# Patient Record
Sex: MALE | Race: WHITE
[De-identification: names, ages, dates, MRNs, and addresses within clinical notes are randomized per-mention and may not be internally consistent; named-entity substitution may affect disease eponyms.]

---

## 2019-03-05 ENCOUNTER — HOSPITAL ENCOUNTER (OUTPATIENT)
Dept: HOSPITAL 25 - ED | Age: 24
Setting detail: OBSERVATION
LOS: 2 days | Discharge: HOME | End: 2019-03-07
Attending: ORTHOPAEDIC SURGERY | Admitting: ORTHOPAEDIC SURGERY
Payer: COMMERCIAL

## 2019-03-05 DIAGNOSIS — S93.314A: Primary | ICD-10-CM
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
## 2019-03-05 DIAGNOSIS — R60.9: ICD-10-CM

## 2019-03-05 DIAGNOSIS — M79.671: ICD-10-CM

## 2019-03-05 DIAGNOSIS — Z88.0: ICD-10-CM

## 2019-03-05 DIAGNOSIS — S92.101A: ICD-10-CM
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
## 2019-03-05 DIAGNOSIS — M25.572: ICD-10-CM

## 2019-03-05 PROCEDURE — 96366 THER/PROPH/DIAG IV INF ADDON: CPT

## 2019-03-05 PROCEDURE — 76000 FLUOROSCOPY <1 HR PHYS/QHP: CPT

## 2019-03-05 PROCEDURE — 90686 IIV4 VACC NO PRSV 0.5 ML IM: CPT

## 2019-03-05 PROCEDURE — 86703 HIV-1/HIV-2 1 RESULT ANTBDY: CPT

## 2019-03-05 PROCEDURE — 96365 THER/PROPH/DIAG IV INF INIT: CPT

## 2019-03-05 PROCEDURE — 96375 TX/PRO/DX INJ NEW DRUG ADDON: CPT

## 2019-03-05 PROCEDURE — 36415 COLL VENOUS BLD VENIPUNCTURE: CPT

## 2019-03-05 PROCEDURE — 96372 THER/PROPH/DIAG INJ SC/IM: CPT

## 2019-03-05 PROCEDURE — 96376 TX/PRO/DX INJ SAME DRUG ADON: CPT

## 2019-03-05 PROCEDURE — 90471 IMMUNIZATION ADMIN: CPT

## 2019-03-05 PROCEDURE — G0378 HOSPITAL OBSERVATION PER HR: HCPCS

## 2019-03-05 PROCEDURE — G0008 ADMIN INFLUENZA VIRUS VAC: HCPCS

## 2019-03-05 PROCEDURE — 99285 EMERGENCY DEPT VISIT HI MDM: CPT

## 2019-03-05 NOTE — ED
Lower Extremity





- HPI Summary


HPI Summary: 





Pt is a 22 y/o M presenting to the ED brought in by EMS for a lower extremity 

injury described as sharp/throbbing. The pt reports he was playing basketball, 

landed on his ankle incorrectly, and twisted it. Movement makes the pain worse.





- History of Current Complaint


Chief Complaint: EDExtremityLower


Stated Complaint: RT DISLOCATED ANKLE PER EMS


Time Seen by Provider: 03/05/19 22:00


Hx Obtained From: Patient


Mechanism Of Injury: Fall From A Standing Position


Onset of Pain: Immediate


Onset/Duration: Still Present


Severity Initially: Severe


Severity Currently: Severe


Pain Intensity: 8


Pain Scale Used: 0-10 Numeric


Timing: Constant, Lasting Hours


Location: Is Discrete @ - R ankle


Character Of Pain: Sharp, Throbbing


Associated Signs And Symptoms: Positive: Swelling, Redness


Aggravating Factor(s): Movement


Alleviating Factor(s): Rest


Able to Bear Weight: No


Related History: Other - basketball game





- Allergies/Home Medications


Allergies/Adverse Reactions: 


 Allergies











Allergy/AdvReac Type Severity Reaction Status Date / Time


 


MS Penicillins [Penicillins] Allergy  Unknown Verified 03/05/19 21:53





   Reaction  





   Details  














PMH/Surg Hx/FS Hx/Imm Hx


Previously Healthy: Yes


Endocrine/Hematology History: 


   Denies: Hx Diabetes


Cardiovascular History: 


   Denies: Hx Congestive Heart Failure, Hx Hypertension


 History: 


   Denies: Hx Renal Disease


Infectious Disease History: No


Infectious Disease History: 


   Denies: Traveled Outside the [place] in Last 30 Days





- Family History


Known Family History: 


   Negative: Renal Disease





- Social History


Occupation: Student


Lives: Dormitory/Roommates


Alcohol Use: None


Hx Substance Use: No


Substance Use Type: Reports: None


Hx Tobacco Use: No


Smoking Status (MU): Never Smoked Tobacco





Review of Systems


Negative: Fever


Positive: Arthralgia, Edema


All Other Systems Reviewed And Are Negative: Yes





Physical Exam





- Summary


Physical Exam Summary: 





VITAL SIGNS: Reviewed.


GENERAL:  Patient is a well-developed and nourished male who is lying 

comfortable in the stretcher. Patient is not in any acute respiratory distress.


HEAD AND FACE: No signs of trauma. No ecchymosis, hematomas or skull 

depressions. No sinus tenderness.


EYES: PERRLA, EOMI x 2, No injected conjunctiva, no nystagmus.


EARS: Hearing grossly intact. Ear canals and tympanic membranes are within 

normal limits.


MOUTH: Oropharynx within normal limits.


NECK: Supple, trachea is midline, no adenopathy, no JVD, no carotid bruit, no c-

spine tenderness, neck with full ROM.


CHEST: Symmetric, no tenderness at palpation


LUNGS: Clear to auscultation bilaterally. No wheezing or crackles.


CVS: Regular rate and rhythm, S1 and S2 present, no murmurs or gallops 

appreciated.


ABDOMEN: Soft, non-tender. No signs of distention. No rebound no guarding, and 

no masses palpated. Bowel sounds are normal.


EXTREMITIES: Obvious deformity of R foot which is medially displaced, there is 

good sensation, good color, and good symmetrical pedal pulses.


NEURO: Alert and oriented x 3. No acute neurological deficits. Speech is normal 

and follows commands.


SKIN: Dry and warm





Triage Information Reviewed: Yes


Vital Signs On Initial Exam: 


 Initial Vitals











Temp Pulse Resp BP Pulse Ox


 


 99.0 F   72   20   165/83   96 


 


 03/05/19 21:45  03/05/19 21:45  03/05/19 21:45  03/05/19 21:45  03/05/19 21:45











Vital Signs Reviewed: Yes





Procedures





- Procedure Summary


Procedure Summary: 





Moderate sedation -  I used standard protocol and the pt consented to moderate 

sedation. I used fentanyl and versed, the pt's vital signs were stable before 

and after procedure. No reversal agent used.





Reduction of R foot - Reduction of subtalar dislocation of R foot. Under 

moderate sedation, the pt's foot was unable to be reduced. Surgery was called 

and the pt will be accepted to immediate surgery.





Neurovascular exam intact pre-reduction and post-reduction.








- Joint Reduction


  ** Right Subtalar


Joint Reduction Site: other - R foot


Specify Other Joint Reduced: R foot


Conscious Sedation: Yes - versed and fentanyl


Reduction Attempts: 1


Pre-Procedure NV Exam: Yes - normal


Post Joint Reduction Film: joint not reduced





Diagnostics





- Vital Signs


 Vital Signs











  Temp Pulse Resp BP Pulse Ox


 


 03/05/19 21:55   73   151/79  97


 


 03/05/19 21:45  99.0 F  72  20  165/83  96














- Laboratory


Lab Statement: Any lab studies that have been ordered have been reviewed, and 

results considered in the medical decision making process.





- Radiology


  ** Ankle X-ray


Radiology Interpretation Completed By: ED Physician


Summary of Radiographic Findings: Subtalar dislocation.  Pending official 

radiology report.





  ** Foot x-ray


Radiology Interpretation Completed By: ED Physician


Summary of Radiographic Findings: Subtalar dislocation.  Pending official 

radiology report.





Lower Extremity Course/Dx





- Course


Course Of Treatment: Pt is a 22 y/o M presenting to the ED brought in by EMS 

for a lower extremity injury described as sharp/throbbing. The pt reports he 

was playing basketball, landed on his ankle incorrectly, and twisted it. 

Movement makes the pain worse. Upon examination, obvious deformity of the R 

foot is noted. Foot/ankle x-rays confirmed this. I used standard protocol and 

the pt consented to moderate sedation. I used fentanyl and versed, the pt's 

vital signs were stable before and after procedure. No reversal agent used. 

Reduction of subtalar dislocation of R foot. Under moderate sedation, the pt's 

foot was unable to be reduced. Dr. Bassett was called and the pt will be 

accepted to surgery.





- Diagnoses


Provider Diagnoses: 


 Dislocation of right subtalar joint








Discharge





- Sign-Out/Discharge


Documenting (check all that apply): Patient Departure





- Discharge Plan


Condition: Stable


Disposition: ADMITTED TO [facility]





- Attestation Statements


Document Initiated by Scribe: Yes


Documenting Scribe: Hilda Cunha


Provider For Whom Tamikoibe is Documenting (Include Credential): Monse Pradhan MD.


Scribe Attestation: 


I, Hilda Cunha, scribed for Monse Pradhan MD. on 03/06/19 at 0201. 


Status of Scribe Document: Ready





Consult


Consult: 





0008 - Spoke with Dr. Sotelo of surgery who will be accepting the pt to [facility] [place] for 

surgery.

## 2019-03-06 RX ADMIN — IBUPROFEN PRN MG: 800 TABLET ORAL at 22:28

## 2019-03-06 RX ADMIN — IBUPROFEN PRN MG: 800 TABLET ORAL at 14:15

## 2019-03-06 RX ADMIN — CEFAZOLIN SODIUM SCH MLS/HR: 1 SOLUTION INTRAVENOUS at 17:04

## 2019-03-06 RX ADMIN — CEFAZOLIN SODIUM SCH MLS/HR: 1 SOLUTION INTRAVENOUS at 08:08

## 2019-03-06 RX ADMIN — IBUPROFEN PRN MG: 800 TABLET ORAL at 05:46

## 2019-03-06 NOTE — PN
Progress Note





- Progress Note


Date of Service: 03/06/19


Note: 





Pt seen and examined. Mild paresthesias globally but no numbness or tingling. 

Pain controlled.





 











Temp Pulse Resp BP Pulse Ox


 


 98.7 F   100   16   134/73   95 


 


 03/06/19 10:25  03/06/19 10:25  03/06/19 10:25  03/06/19 10:25  03/06/19 10:25








NAD. RLE splint intact. SILT grossly distally. brisk cap refill. able to flex/

ext toes. LLE: swelling and bruising laterally. able to DF/PF ankle. SILT 

grossly. brisk cap refill





A/P


POD#0 from open treatment of talonavicular and subtalar dislocation with 

concomitant talus fracture


NWB


will follow with Dr Phillips post operatively. 


discharge when meets PT goals and pain management. 


DVT ppx LMWH while in house.

## 2019-03-06 NOTE — OP
Operative Report - Blank





- Operative Report


Date of Operation: 03/06/19


Note: 


PATIENT: Yayo Bazzi





YOB: 1995





DATE OF SURGERY: 3/7/2019





SURGEON: Scotty Phillips MD





CO-SURGEON: Racheal Bassett MD





ANESTHESIOLOGIST: Dr. Brock





PREOPERATIVE DIAGNOSIS: Right subtalar joint dislocation. Right talonavicular 

joint dislocation.





POSTOPERATIVE DIAGNOSIS: Right subtalar joint dislocation. Right talonavicular 

joint dislocation. Right talar head fracture.





OPERATION: 


1. Open reduction of right subtalar joint dislocation


2. Open reduction of right talonavicular joint dislocation


3. Open treatment of right talar head fracture





ANESTHESIA: General





IMPLANTS: none





TOURNIQUET TIME: One hour with a well-padded thigh tourniquet at 250mmHg





SPECIMENS: none





ESTIMATED BLOOD LOSS: 20cc





COMPLICATIONS: none





STATUS: Stable from the operating room to the recovery room and then admitted.





INDICATIONS FOR PROCEDURE:


Yayo sustained a right ankle and foot injury playing basketball.  He was 

diagnosed with subtalar and talonavicular dislocations in the ER.  He was 

brought to the OR with my colleague, Dr. Bassett, and closed reduction was 

attempted and unsuccessful.  At this time, she called me to ask for my 

assistance, as this would need to proceed with open surgery. Upon my arrival to 

the operating room I attempted a closed reduction, but was unsuccessful, so we 

elected to move forward with open surgery. 





DESCRIPTION OF PROCEDURE:


The patient was carefully positioned on the operating room table. All bony 

prominences were padded with great care. A chlorhexidine based pre-scrub was 

performed followed by a chloraprep prep and drape in standard sterile fashion. 

A surgical safety pause was then conducted in which we confirmed the 

appropriate patient, extremity, planned procedure, availability of equipment, 

indication and administration of prophylactic antibiotics, and DVT prophylaxis 

in the form of a compression boot on the non-surgical extremity. 





We performed an Esmarch exsanguination of the limb and inflated the tourniquet.

  I utilized a sinus tarsi incision at the lateral hindfoot.  I carefully 

dissected down to the level of the subtalar joint, making sure to protect the 

peroneal tendons.  The extensor digitorum brevis and lateral ankle and hindfoot 

ligaments and capsular tissue had invaginated into the subtalar joint, so we 

removed these from the joint, which provided excellent visualization of the 

subtalar joint.  





Distally, the talar head was visualized.  Unfortunately, there was an 

osteochondral fracture of the inferior talar head.  The cartilage on this 

fragment was severely damaged and there were no soft tissue attachments to the 

displaced fracture fragment.





At this point, we tried to reduce the talonavicular and subtalar joints but 

were unsuccessful.  The block was at the talonavicular joint and the displaced 

talar head fracture was also blocking reduction.  Given that this was a free 

fragment, and the overlying cartilage was severely damaged, this fragment was 

excised.  I removed all obvious blocks to reduction laterally at the 

talonavicular joint, including capsular tissue.  It appeared that the lateral 

navicular was hinged on the talar neck medially.  Therefore, we decided to make 

a medial incision.





A longitudinal incision was then made between the tibialis anterior and 

posterior tibial tendons at the medial hindfoot.  We carefully dissected down 

to the layer of the bone.  There was in fact capsular tissue at the lateral 

navicular that was preventing mobilization of the navicular and reduction of 

the talonavicular joint.  This tissue was incised.  I was then able to use a 

Bess to carefully shoehorn the navicular back onto the talar head, thus 

performing an open reduction of both the talonavicular and subtalar joints.





Fluoroscopy was then used to confirm reduction of the hindfoot joints, notably 

the talonavicular, calcaneocuboid, and subtalar joints.





The wounds were then copiously irrigated and meticulously closed in layers 

utilizing 3-0 Monocryl for the lateral ligament, capsule, and EDB tissues.  3-0 

Monocryl was then used for the deep dermal layer. Skin staples and 3-0 nylon 

were used for the skin.  A sterile dressing was then applied followed by a 

splint with the ankle in a neutral position. 





The patient was then awakened from anesthesia and transferred to the recovery 

room in stable condition.  There were no complications.  All needle and sponge 

counts were correct at the end of the case.





ATTESTATION: I attest I was present and scrubbed for the entire open part of 

the procedure.





POSTOPERATIVE PLAN: The plan is for non-weight-bearing in a splint, elevation.

## 2019-03-06 NOTE — PN
Progress Note





- Progress Note


Date of Service: 03/06/19


Note: 


I saw Yayo this morning. Pain well controlled. Elevating b/l LE's. Toes WWP 

and wiggling. TTP at left lateral ankle.





We discussed injuries and surgery. Discussed the high likelihood of persistent 

problems with these severe injuries. 





Plan:


CT Right ankle and foot


Xray left ankle


NWB RLE, with strict elevation


Tall boot as needed for ambulation LLE


Aspirin 325mg po daily for DVT prophylaxis





Scotty Phillips MD

## 2019-03-06 NOTE — CONS
CONSULTATION REPORT:

 

DATE OF CONSULT:  03/05/19

 

CHIEF COMPLAINT:  Right foot pain, left ankle pain.

 

HISTORY OF PRESENT ILLNESS:  Briefly, Yayo is a 23-year-old male who was 
playing basketball today around 8:50 p.m. when he sustained an injury.  He 
landed awkwardly on his left and right ankle.  He sustained immediate deformity
, was unable to weight bear comfortably.  He was brought to the ER immediately 
and diagnosed with a medial subtalar dislocation.  He underwent attempted 
closed reduction and it was unable to be reduced, so I was called at proximally 
10:40.  I came in to see the patient at that time.  He denies any numbness or 
tingling.  He is reporting some ankle pain and swelling that is bothering him.  
He states the pain is about 5/10. No previous history of ankle issues at the 
time.

 

PAST MEDICAL HISTORY:  Negative.

 

PAST SURGICAL HISTORY:  Appendectomy.  He had no problems with anesthesia.

 

ALLERGIES:  Possible allergy to PENICILLIN, although family noted he had hives.
  He is not sure if he has a reaction.

 

FAMILY HISTORY:  Negative.  No history of DVT.

 

SOCIAL HISTORY:  He is a graduate from [employer] and works in social media 
campaign.  He is right-hand dominant.  He is a community ambulator.  He denies 
tobacco and alcohol.  He excises by playing basketball.  Review of systems is 
significant for the above complaint as well as left ankle pain and swelling.  
No shortness of breath or chest pain.  No numbness or tingling.  Otherwise, 
remainder of the systems is negative.

 

REVIEW OF SYSTEMS:  Otherwise negative with the exception of the above 
complaints of bilateral ankle pain. 



Vital Signs: T 99; pulse 72; RR 20; /83; pulse ox 96 on mask breather

 

PHYSICAL EXAM:  He is in no acute distress.  He is well developed, well 
nourished. He is alert and oriented x3.  Pleasant mood, normal affect.  Good 
balance and coordination of upper extremities.  EOMI. Chest CTA. CV: RRR. 
Abdomen: soft and nontender. He is able to move his bilateral upper 
extremities.  His left ankle has some swelling and tenderness laterally. 
Examination of the right foot demonstrate obvious deformity.  The skin is 
intact. There is no erythema or warmth.  There is a medial dislocation of the 
foot.  He is able to flex and extend his great toe and lesser toes.  He is 
sensate to light touch about the first dorsal webspace, and medial, lateral, 
dorsal, and plantar foot.  He has brisk cap refill and palpable DP pulse.

 

DIAGNOSTIC STUDIES/LAB DATA:  X-rays were reviewed of the foot and ankle that 
demonstrate what looks like an isolated subtalar dislocation.  On long view, it 
looks like there possibly is a fracture, although it is difficult to assess now.

 

ASSESSMENT AND PLAN:  We will take him to the OR emergently to undergo a closed 
versus open reduction of right foot under anesthesia.  We will place him in a 
splint.  We talked about the risks and benefits of surgery.  Risks include, but 
limited to, bleeding, infection, damage to nerves, vessels, surrounding 
structures, wound nonhealing, persistent pain, need for further surgery, 
scarring, stiffness, incomplete relief of symptoms, and risk of anesthesia.  We 
will attempt closed reduction, and if it needs to be opened, we will open.  I 
will have him follow up with Dr. Phillips post operatively. We will xray his 
left ankle after surgery if he requires admission. I will place the patient in 
a well-padded posterior split, ice, and elevate.  I will have him see my foot 
and ankle colleague for followup afterwards.

 

 [id number] #: [id number]

BRENDA

## 2019-03-06 NOTE — OP
OPERATIVE REPORT:

 

DATE OF OPERATION:  03/05/19

 

DATE OF BIRTH:  [birth year]

 

ATTENDING SURGEON:  Racheal Bassett MD.

 

CO-SURGEON:  Scotty Phillips MD.

 

ANESTHESIOLOGIST:  Dr. Brock

 

ANESTHESIA:  General.

 

PRE-OP DIAGNOSES:  Right closed subtalar and talonavicular dislocation.

 

POST-OP DIAGNOSES:  Right closed subtalar and talonavicular dislocation with 
talus fracture.

 

OPERATIVE PROCEDURE:  Open treatment of subtalar dislocation, open treatment of 
talonavicular dislocation, open treatment of talar head fracture.

 

COMPLICATIONS:  None.

 

ESTIMATED BLOOD LOSS:  20 cc.

 

TOURNIQUET TIME:  Approximately 55 minutes.

 

INDICATIONS:  Yayo Bazzi is a 23-year-old male who was playing basketball 
on the evening of 03/05/19, when he sustained injury to his left ankle and 
right foot and had an obvious deformity.  He was seen in the ER, diagnosed with 
subtalar dislocation.  It was not able to be put back with light sedation, 
therefore I was consulted and then brought him to the OR for closed versus open 
reduction under general anesthesia.

 

DESCRIPTION OF PROCEDURE:  The patient was greeted in the preoperative area by 
the attending surgeon.  Correct extremity was marked.  Consent was confirmed.  
The patient was brought back to the operative suite where he slept in the 
stretcher initially.  He underwent a general anesthesia and endotracheal 
intubation.  After appropriate surgical pause indicating site, side, procedure, 
we had no safety concerns.  A closed reduction was attempted, but the patient 
completely paralyzed and was unable to be fully reduced.  This was tried for 
approximately 20 minutes and then I called my partner as it was not able to be 
reduced.  Dr. Phillips, who then drove in to help me address this, attempted a 
closed reduction as well. As we were both unable to do this, we decided to 
treat this open.  For details of the the open portion of the case please see Dr Phillips's operating note. We both co- scrubbed in the case and both provided 
clinical input and were both present for key portions of this case as it was a 
challenging case.  During the operation, it was discovered that the patient had 
a talus head fracture and a chondral flap that was blocking the reduction and 
required medial and lateral incisions.  The patient did wake up at the end of 
the case.  Patient was placed in well-padded splint with brisk cap refill of 
his toes.

 

POSTOPERATIVE PLAN:  He will be nonweightbearing.  He does have an ankle sprain 
on the left side.  We will x-ray the left side.  We will also get a CT scan of 
the right ankle.  Dr. Phillips will maintain definitive management of his right 
ankle. We will admit the patient as it is very late at night and potentially 
discharge him in the morning with pain control when he is able to mobilize 
comfortably.

 

 [id number]/CPS #: [id number]

BRENDA

## 2019-03-07 VITALS — DIASTOLIC BLOOD PRESSURE: 67 MMHG | SYSTOLIC BLOOD PRESSURE: 144 MMHG

## 2019-03-07 RX ADMIN — IBUPROFEN PRN MG: 800 TABLET ORAL at 07:57

## 2019-03-07 NOTE — PN
Progress Note





- Progress Note


Date of Service: 03/07/19


SOAP: 


Subjective:


[]Pt seen at bedside. He is feeling well with well controlled right ankle pain. 

Left ankle is nonpainful, he has been able to ambulate using the boot. Denies CP

, SOB, dizziness, nausea.





Objective:


[]General: Well appearing, NAD 


RLE: SPlint CDI, no erythema proximal or distal. Able to flex and extend MTPs, 

sensation intact to light touch and capillary refill less than two seconds 

distally


Left ankle nontender to gentle palpation, DF/PF intact, minimal edema. 





Assessment:


[]POD 2 sp


Open reduction of right subtalar joint dislocation


Open reduction of right talonavicular joint dislocation


Open treatment of right talar head fracture








Plan:


[]NWB RLE 


WBAT LLE, boot for comfort


 qd 


DC to home, FU Dr Phillips 3/19-20/19. Pt's mother reports he may not be able 

to follow up with our office as it is out of network. Pt and mother understand 

he needs orthopedic follow up 10 days post op sooner for concerns, optimally 

with surgical team but if unable please ensure he does have ortho follow up in 

this time frame. 








 Vital Signs











Temp  98.9 F   03/07/19 11:09


 


Pulse  85   03/07/19 11:09


 


Resp  16   03/07/19 11:09


 


BP  144/67   03/07/19 11:09


 


Pulse Ox  99   03/07/19 11:43








 Intake & Output











 03/06/19 03/07/19 03/07/19





 18:59 06:59 18:59


 


Intake Total 1186 1100 


 


Output Total 875 1850 800


 


Balance 311 -750 -800


 


Intake:   


 


  IV Fluids 396  


 


      


 


  Medicated   


 


    Cefazolin 110  


 


  Oral 680 1100 


 


Output:   


 


  Urine 875 1850 800


 


Other:   


 


  # Voids 3  








 Laboratory Last Values











HIV 1&2 Antibody  Nonreactive  (Nonreactive)   03/06/19  05:08

## 2019-03-07 NOTE — DS
*** AMENDED REPORT NOW INCLUDES DESIGNATED COSIGNER ***



CC:  Dr. Scotty Phillips *

 

DISCHARGE SUMMARY:

 

DATE OF ADMISSION:  03/06/19

 

DATE OF DISCHARGE: 3/7/19

 

PROVIDER:  Dr. Racheal Bassett.* (DICTATED BY EFRAIN DELGADO)

 

SURGEON:  Dr. Racheal Bassett and Dr. Scotty Phillips.

 

PRE-OP DIAGNOSIS:  Right closed subtalar and talonavicular dislocation.

 

POST-OP DIAGNOSIS: Right closed subtalar and talonavicular dislocation.

 

OPERATIVE PROCEDURE:  Open treatment of subtalar dislocation and open treatment 
of talonavicular dislocation and open treatment of talar head fracture.

 

HISTORY:  Yayo Bazzi is a 23-year-old male who was playing basketball on 
the evening of 03/05/19 when he sustained an injury to the left ankle in the 
right side and had obvious deformity of the left ankle.  He was seen in the ER, 
diagnosed with a subtalar dislocation.  It was unable to be reduced with light 
sedation and the patient was brought to the OR for the closed reduction versus 
open reduction.

 

HOSPITAL COURSE:  The patient was admitted to [facility] on 03/06/
19. He underwent an open treatment of subtalar dislocation, open treatment of 
talonavicular dislocation, and open treatment of talar head fracture.  Postop 
day #1, he was well appearing, in no acute distress.  CT of the right ankle and 
foot were ordered.  Left ankle x-ray showed soft tissue swelling without 
fracture. Right ankle CT showed reduction of previously identified subtalar 
dislocation, bone and fragment is noted in the medial talar subcutaneous, 
therefore, presumed open reduction alignment of the tarsal bone, which appears 
to be anatomic.  Postop day 2, he is well appearing, he is in no acute 
distress.  Splint was clean, dry and intact on the right foot.  No erythema 
proximal or distal.  He was able to flex and extend MCPs.  Sensation is intact 
to light touch and capillary refill less than 2 seconds distally.  Left ankle 
nontender to gentle palpation.  Dorsiflexion and plantarflexion intact at the 
ankle, minimal edema.  The patient was deemed to be medically and 
orthopedically stable for discharge home.  Vital Signs:  Temperature 98.9, 
pulse 85, respiratory rate 16, blood pressure 144/67, pulse ox 99%.

 

DISCHARGE MEDICATIONS:

1.  Aspirin 325 mg daily.

2.  Ibuprofen 800 mg p.o. q.8 hours p.r.n. pain.

3.  Oxycodone 5 mg p.o. q.4 hours p.r.n. pain.

4.  Docusate 100 mg p.o. b.i.d. p.r.n. constipation.

 

DISCHARGE PLAN:  The patient will be discharged to home on 03/07/19.  He is 
nonweightbearing of the right lower extremity.  Keep the splint clean, dry and 
intact.  Weightbearing as tolerated on the left lower extremity.  Boot for 
comfort. Aspirin 325 mg daily to  prevent blood clots.  Oxycodone 5 mg 1 to 2 
tabs every 4 to 6 hours as needed for pain, maximum 6 tabs per day.  Follow up 
with Dr. Phillips 03/19/19 to 03/20/19 and call for appointment with any 
concerns. If you have any chest pain or shortness of breath, go to the 
emergency room.  If you have any severe increased pain, redness, chills or fever
, call the orthopedic office.  He is discharged to home.

 

____________________________________ EFRAIN BARAJAS

 

[id number]/CPS #: [id number]

BRENDA